# Patient Record
Sex: MALE | Race: WHITE | NOT HISPANIC OR LATINO | URBAN - METROPOLITAN AREA
[De-identification: names, ages, dates, MRNs, and addresses within clinical notes are randomized per-mention and may not be internally consistent; named-entity substitution may affect disease eponyms.]

---

## 2023-08-09 ENCOUNTER — HOSPITAL ENCOUNTER (EMERGENCY)
Facility: HOSPITAL | Age: 53
Discharge: HOME | End: 2023-08-10
Attending: EMERGENCY MEDICINE
Payer: COMMERCIAL

## 2023-08-09 DIAGNOSIS — M10.071 ACUTE IDIOPATHIC GOUT INVOLVING TOE OF RIGHT FOOT: Primary | ICD-10-CM

## 2023-08-09 DIAGNOSIS — R52 PAIN: ICD-10-CM

## 2023-08-09 LAB
ANION GAP SERPL CALC-SCNC: 12 MMOL/L (ref 8–16)
BASOPHILS # BLD AUTO: 0.03 K/UL (ref 0–0.2)
BASOPHILS NFR BLD: 0.3 % (ref 0–1.9)
BUN SERPL-MCNC: 10 MG/DL (ref 6–20)
CALCIUM SERPL-MCNC: 9.3 MG/DL (ref 8.7–10.5)
CHLORIDE SERPL-SCNC: 103 MMOL/L (ref 95–110)
CO2 SERPL-SCNC: 23 MMOL/L (ref 23–29)
CREAT SERPL-MCNC: 1 MG/DL (ref 0.5–1.4)
DIFFERENTIAL METHOD: NORMAL
EOSINOPHIL # BLD AUTO: 0.2 K/UL (ref 0–0.5)
EOSINOPHIL NFR BLD: 1.7 % (ref 0–8)
ERYTHROCYTE [DISTWIDTH] IN BLOOD BY AUTOMATED COUNT: 12 % (ref 11.5–14.5)
EST. GFR  (NO RACE VARIABLE): >60 ML/MIN/1.73 M^2
GLUCOSE SERPL-MCNC: 98 MG/DL (ref 70–110)
HCT VFR BLD AUTO: 44.2 % (ref 40–54)
HGB BLD-MCNC: 14.6 G/DL (ref 14–18)
IMM GRANULOCYTES # BLD AUTO: 0.03 K/UL (ref 0–0.04)
IMM GRANULOCYTES NFR BLD AUTO: 0.3 % (ref 0–0.5)
LYMPHOCYTES # BLD AUTO: 2.2 K/UL (ref 1–4.8)
LYMPHOCYTES NFR BLD: 22 % (ref 18–48)
MCH RBC QN AUTO: 29.1 PG (ref 27–31)
MCHC RBC AUTO-ENTMCNC: 33 G/DL (ref 32–36)
MCV RBC AUTO: 88 FL (ref 82–98)
MONOCYTES # BLD AUTO: 0.8 K/UL (ref 0.3–1)
MONOCYTES NFR BLD: 8.2 % (ref 4–15)
NEUTROPHILS # BLD AUTO: 6.7 K/UL (ref 1.8–7.7)
NEUTROPHILS NFR BLD: 67.5 % (ref 38–73)
NRBC BLD-RTO: 0 /100 WBC
PLATELET # BLD AUTO: 156 K/UL (ref 150–450)
PMV BLD AUTO: 10.1 FL (ref 9.2–12.9)
POTASSIUM SERPL-SCNC: 4.8 MMOL/L (ref 3.5–5.1)
RBC # BLD AUTO: 5.02 M/UL (ref 4.6–6.2)
SODIUM SERPL-SCNC: 138 MMOL/L (ref 136–145)
URATE SERPL-MCNC: 6.3 MG/DL (ref 3.4–7)
WBC # BLD AUTO: 9.97 K/UL (ref 3.9–12.7)

## 2023-08-09 PROCEDURE — 85025 COMPLETE CBC W/AUTO DIFF WBC: CPT | Performed by: EMERGENCY MEDICINE

## 2023-08-09 PROCEDURE — 80048 BASIC METABOLIC PNL TOTAL CA: CPT | Performed by: EMERGENCY MEDICINE

## 2023-08-09 PROCEDURE — 25000003 PHARM REV CODE 250: Performed by: EMERGENCY MEDICINE

## 2023-08-09 PROCEDURE — 99284 EMERGENCY DEPT VISIT MOD MDM: CPT

## 2023-08-09 PROCEDURE — 84550 ASSAY OF BLOOD/URIC ACID: CPT | Performed by: EMERGENCY MEDICINE

## 2023-08-09 RX ORDER — ACETAMINOPHEN 500 MG
1000 TABLET ORAL
Status: COMPLETED | OUTPATIENT
Start: 2023-08-09 | End: 2023-08-09

## 2023-08-09 RX ORDER — NAPROXEN 500 MG/1
500 TABLET ORAL
Status: COMPLETED | OUTPATIENT
Start: 2023-08-09 | End: 2023-08-09

## 2023-08-09 RX ORDER — LISDEXAMFETAMINE DIMESYLATE 50 MG/1
50 CAPSULE ORAL EVERY MORNING
COMMUNITY
Start: 2023-08-01

## 2023-08-09 RX ORDER — ATORVASTATIN CALCIUM 10 MG/1
10 TABLET, FILM COATED ORAL
COMMUNITY
Start: 2023-07-22

## 2023-08-09 RX ORDER — PANTOPRAZOLE SODIUM 40 MG/1
40 TABLET, DELAYED RELEASE ORAL
COMMUNITY
Start: 2023-07-22

## 2023-08-09 RX ORDER — LEVOCETIRIZINE DIHYDROCHLORIDE 5 MG/1
5 TABLET, FILM COATED ORAL
COMMUNITY
Start: 2023-07-22

## 2023-08-09 RX ORDER — COLCHICINE 0.6 MG/1
1.2 TABLET, FILM COATED ORAL
Status: COMPLETED | OUTPATIENT
Start: 2023-08-09 | End: 2023-08-09

## 2023-08-09 RX ADMIN — ACETAMINOPHEN 1000 MG: 500 TABLET ORAL at 10:08

## 2023-08-09 RX ADMIN — NAPROXEN 500 MG: 500 TABLET ORAL at 10:08

## 2023-08-09 RX ADMIN — COLCHICINE 1.2 MG: 0.6 TABLET, FILM COATED ORAL at 10:08

## 2023-08-10 VITALS
SYSTOLIC BLOOD PRESSURE: 144 MMHG | RESPIRATION RATE: 18 BRPM | DIASTOLIC BLOOD PRESSURE: 92 MMHG | HEART RATE: 60 BPM | TEMPERATURE: 98 F | WEIGHT: 210 LBS | OXYGEN SATURATION: 98 %

## 2023-08-10 PROCEDURE — 25000003 PHARM REV CODE 250: Performed by: EMERGENCY MEDICINE

## 2023-08-10 RX ORDER — ACETAMINOPHEN 500 MG
500 TABLET ORAL
Qty: 42 TABLET | Refills: 0 | Status: SHIPPED | OUTPATIENT
Start: 2023-08-10 | End: 2023-08-17

## 2023-08-10 RX ORDER — COLCHICINE 0.6 MG/1
0.6 TABLET, FILM COATED ORAL
Status: COMPLETED | OUTPATIENT
Start: 2023-08-10 | End: 2023-08-10

## 2023-08-10 RX ORDER — NAPROXEN 500 MG/1
500 TABLET ORAL 2 TIMES DAILY WITH MEALS
Qty: 14 TABLET | Refills: 0 | Status: SHIPPED | OUTPATIENT
Start: 2023-08-10 | End: 2023-08-17

## 2023-08-10 RX ORDER — COLCHICINE 0.6 MG/1
0.6 CAPSULE ORAL 2 TIMES DAILY
Qty: 28 CAPSULE | Refills: 0 | Status: SHIPPED | OUTPATIENT
Start: 2023-08-10 | End: 2023-08-24

## 2023-08-10 RX ADMIN — COLCHICINE 0.6 MG: 0.6 TABLET, FILM COATED ORAL at 01:08

## 2023-08-10 NOTE — ED PROVIDER NOTES
"CC: Leg Pain ("I think I have gout", blood work unremarkable with previous exam )      History provided by:   Patient     HPI: Mark Desai is a 53 y.o. year old male who presents to the ED for   Right foot 1st MTP joint edema, redness pain since Mon    No injury  No fever, no n/v    Previous right ankle pain he thought it was gout    PMH: GERD, seasonal allergies  Meds: lipitor, vivans    PHYSICAL EXAM:  Vitals:    08/09/23 2114   BP: (!) 141/97   Pulse: 76   Resp: 16   Temp: 98.1 °F (36.7 °C)     VS: triage VS reviewed    general: comfortable, in no acute distress, pleasant, well nourished  HENT: neck symmetric, trachea midline  Eyes: no conjunctival injection and symmetrical eyelids  CV: RRR  Resp: comfortable breathing, speaks in full sentences  Neuro: AAO x 3, face symmetric, speech normal    Right foot skin intact, +2 DP PT, erythema, swelling and tenderness to palpation over the right 1st metatarsophalangeal joint    MDM:  Mark Desai is a 53 y.o. year old male who presents to the ED for pain, redness, swelling over the 1st right metatarsophalangeal joint Patient reports a similar episode of right ankle swelling that he thought it was gout however he was told it was not   He is here on vacation from [place]  No systemic signs of infection   No trauma     Most likely this is gout    DDX includes but not limited to: gout >> fracture, septic arthritis    Labs ordered and reviewed:   CBC wnl  BMP wnl  Uric acid 6.3    Medication given in the ED:  Tylenol, naproxen, colchicine    Right foot x-ray (ordered and reviewed): no cute  Imagings independently visualized: n/a    Discussed treatment with colchicine, tylenol, naproxen. Crutches, plan for F/U with PCP and rheumatology when  he returns home  The patient has been carefully educated about symptoms and conditions that should prompt immediate return to the ED for recheck or further evaluation. Told to return immediately for any new or worsening or progressive " symptoms.     IMPRESSION:  1.) right foot gout      Dispo: Discharge    Critical Care Time: N/A          No past medical history on file.  No past surgical history on file.  No family history on file.  No current facility-administered medications on file prior to encounter.     No current outpatient medications on file prior to encounter.     Patient has no known allergies.  Social History     Socioeconomic History    Marital status:                  DATA & INTERVENTIONS:    LABS reviewed:  Labs Reviewed - No data to display    RADIOLOGY reviewed:  Imaging Results    None         MEDICATIONS/FLUIDS:  Medications - No data to display           Moni Leal MD  08/10/23 8741

## 2023-08-10 NOTE — DISCHARGE INSTRUCTIONS
Return to the emergency department if the pain gets worse if the area of redness and swelling spread up the food and the lower leg, if you develop any fever, vomiting, chills, lightheadedness or any other concerns   Please follow-up with your family doctor and rheumatologist when you return home